# Patient Record
Sex: FEMALE | Race: WHITE | Employment: OTHER | URBAN - METROPOLITAN AREA
[De-identification: names, ages, dates, MRNs, and addresses within clinical notes are randomized per-mention and may not be internally consistent; named-entity substitution may affect disease eponyms.]

---

## 2023-05-22 ENCOUNTER — HOSPITAL ENCOUNTER (INPATIENT)
Age: 88
LOS: 4 days | Discharge: NURSING, REHAB OR LONG-TERM CARE | End: 2023-05-26
Attending: STUDENT IN AN ORGANIZED HEALTH CARE EDUCATION/TRAINING PROGRAM | Admitting: INTERNAL MEDICINE
Payer: COMMERCIAL

## 2023-05-22 ENCOUNTER — APPOINTMENT (OUTPATIENT)
Dept: GENERAL RADIOLOGY | Age: 88
End: 2023-05-22
Payer: COMMERCIAL

## 2023-05-22 DIAGNOSIS — D62 ACUTE BLOOD LOSS ANEMIA: ICD-10-CM

## 2023-05-22 DIAGNOSIS — K92.2 UPPER GI BLEED: ICD-10-CM

## 2023-05-22 DIAGNOSIS — J18.9 PNEUMONIA OF RIGHT LOWER LOBE DUE TO INFECTIOUS ORGANISM: ICD-10-CM

## 2023-05-22 DIAGNOSIS — K92.0 COFFEE GROUND EMESIS: Primary | ICD-10-CM

## 2023-05-22 DIAGNOSIS — Z86.19 HISTORY OF ESBL E. COLI INFECTION: ICD-10-CM

## 2023-05-22 DIAGNOSIS — I50.1 PULMONARY EDEMA WITH CONGESTIVE HEART FAILURE (HCC): ICD-10-CM

## 2023-05-22 LAB
ABO + RH BLD: NORMAL
ALBUMIN SERPL-MCNC: 2.5 G/DL (ref 3.4–5)
ALBUMIN/GLOB SERPL: 0.8 {RATIO} (ref 1.1–2.2)
ALP SERPL-CCNC: 65 U/L (ref 40–129)
ALT SERPL-CCNC: 23 U/L (ref 10–40)
ANION GAP SERPL CALCULATED.3IONS-SCNC: 15 MMOL/L (ref 3–16)
AST SERPL-CCNC: 54 U/L (ref 15–37)
BACTERIA URNS QL MICRO: ABNORMAL /HPF
BASOPHILS # BLD: 0 K/UL (ref 0–0.2)
BASOPHILS NFR BLD: 0 %
BILIRUB SERPL-MCNC: 0.3 MG/DL (ref 0–1)
BILIRUB UR QL STRIP.AUTO: NEGATIVE
BLD GP AB SCN SERPL QL: NORMAL
BUN SERPL-MCNC: 45 MG/DL (ref 7–20)
CALCIUM SERPL-MCNC: 7.9 MG/DL (ref 8.3–10.6)
CHLORIDE SERPL-SCNC: 113 MMOL/L (ref 99–110)
CLARITY UR: CLEAR
CO2 SERPL-SCNC: 19 MMOL/L (ref 21–32)
COLOR UR: YELLOW
CREAT SERPL-MCNC: 0.9 MG/DL (ref 0.6–1.2)
DEPRECATED RDW RBC AUTO: 14.1 % (ref 12.4–15.4)
EOSINOPHIL # BLD: 0 K/UL (ref 0–0.6)
EOSINOPHIL NFR BLD: 0 %
EPI CELLS #/AREA URNS HPF: ABNORMAL /HPF (ref 0–5)
GFR SERPLBLD CREATININE-BSD FMLA CKD-EPI: 57 ML/MIN/{1.73_M2}
GLUCOSE SERPL-MCNC: 135 MG/DL (ref 70–99)
GLUCOSE UR STRIP.AUTO-MCNC: NEGATIVE MG/DL
HCT VFR BLD AUTO: 22.4 % (ref 36–48)
HGB BLD-MCNC: 7.3 G/DL (ref 12–16)
HGB UR QL STRIP.AUTO: NEGATIVE
INR PPP: 2.23 (ref 0.84–1.16)
KETONES UR STRIP.AUTO-MCNC: NEGATIVE MG/DL
LEUKOCYTE ESTERASE UR QL STRIP.AUTO: NEGATIVE
LYMPHOCYTES # BLD: 1.2 K/UL (ref 1–5.1)
LYMPHOCYTES NFR BLD: 5.3 %
MAGNESIUM SERPL-MCNC: 1.4 MG/DL (ref 1.8–2.4)
MCH RBC QN AUTO: 30.3 PG (ref 26–34)
MCHC RBC AUTO-ENTMCNC: 32.4 G/DL (ref 31–36)
MCV RBC AUTO: 93.5 FL (ref 80–100)
MONOCYTES # BLD: 0.7 K/UL (ref 0–1.3)
MONOCYTES NFR BLD: 3.2 %
NEUTROPHILS # BLD: 20.1 K/UL (ref 1.7–7.7)
NEUTROPHILS NFR BLD: 91.5 %
NITRITE UR QL STRIP.AUTO: NEGATIVE
NT-PROBNP SERPL-MCNC: 4891 PG/ML (ref 0–449)
PH UR STRIP.AUTO: 6 [PH] (ref 5–8)
PLATELET # BLD AUTO: 365 K/UL (ref 135–450)
PMV BLD AUTO: 8.9 FL (ref 5–10.5)
POTASSIUM SERPL-SCNC: 3 MMOL/L (ref 3.5–5.1)
PROT SERPL-MCNC: 5.6 G/DL (ref 6.4–8.2)
PROT UR STRIP.AUTO-MCNC: 30 MG/DL
PROTHROMBIN TIME: 24.6 SEC (ref 11.5–14.8)
RBC # BLD AUTO: 2.39 M/UL (ref 4–5.2)
RBC #/AREA URNS HPF: ABNORMAL /HPF (ref 0–4)
SARS-COV-2 RDRP RESP QL NAA+PROBE: NOT DETECTED
SODIUM SERPL-SCNC: 147 MMOL/L (ref 136–145)
SP GR UR STRIP.AUTO: 1.02 (ref 1–1.03)
TROPONIN, HIGH SENSITIVITY: 59 NG/L (ref 0–14)
TROPONIN, HIGH SENSITIVITY: 66 NG/L (ref 0–14)
UA COMPLETE W REFLEX CULTURE PNL UR: ABNORMAL
UA DIPSTICK W REFLEX MICRO PNL UR: YES
URN SPEC COLLECT METH UR: ABNORMAL
UROBILINOGEN UR STRIP-ACNC: 0.2 E.U./DL
WBC # BLD AUTO: 22 K/UL (ref 4–11)
WBC #/AREA URNS HPF: ABNORMAL /HPF (ref 0–5)

## 2023-05-22 PROCEDURE — C9113 INJ PANTOPRAZOLE SODIUM, VIA: HCPCS | Performed by: STUDENT IN AN ORGANIZED HEALTH CARE EDUCATION/TRAINING PROGRAM

## 2023-05-22 PROCEDURE — 86850 RBC ANTIBODY SCREEN: CPT

## 2023-05-22 PROCEDURE — 96367 TX/PROPH/DG ADDL SEQ IV INF: CPT

## 2023-05-22 PROCEDURE — C9113 INJ PANTOPRAZOLE SODIUM, VIA: HCPCS | Performed by: INTERNAL MEDICINE

## 2023-05-22 PROCEDURE — 83735 ASSAY OF MAGNESIUM: CPT

## 2023-05-22 PROCEDURE — 83880 ASSAY OF NATRIURETIC PEPTIDE: CPT

## 2023-05-22 PROCEDURE — 81001 URINALYSIS AUTO W/SCOPE: CPT

## 2023-05-22 PROCEDURE — 85025 COMPLETE CBC W/AUTO DIFF WBC: CPT

## 2023-05-22 PROCEDURE — 99285 EMERGENCY DEPT VISIT HI MDM: CPT

## 2023-05-22 PROCEDURE — 6360000002 HC RX W HCPCS: Performed by: INTERNAL MEDICINE

## 2023-05-22 PROCEDURE — 96375 TX/PRO/DX INJ NEW DRUG ADDON: CPT

## 2023-05-22 PROCEDURE — 86900 BLOOD TYPING SEROLOGIC ABO: CPT

## 2023-05-22 PROCEDURE — 85610 PROTHROMBIN TIME: CPT

## 2023-05-22 PROCEDURE — 80053 COMPREHEN METABOLIC PANEL: CPT

## 2023-05-22 PROCEDURE — 84484 ASSAY OF TROPONIN QUANT: CPT

## 2023-05-22 PROCEDURE — 2060000000 HC ICU INTERMEDIATE R&B

## 2023-05-22 PROCEDURE — 71045 X-RAY EXAM CHEST 1 VIEW: CPT

## 2023-05-22 PROCEDURE — 87040 BLOOD CULTURE FOR BACTERIA: CPT

## 2023-05-22 PROCEDURE — 2580000003 HC RX 258: Performed by: STUDENT IN AN ORGANIZED HEALTH CARE EDUCATION/TRAINING PROGRAM

## 2023-05-22 PROCEDURE — 6360000002 HC RX W HCPCS: Performed by: STUDENT IN AN ORGANIZED HEALTH CARE EDUCATION/TRAINING PROGRAM

## 2023-05-22 PROCEDURE — 86901 BLOOD TYPING SEROLOGIC RH(D): CPT

## 2023-05-22 PROCEDURE — 96365 THER/PROPH/DIAG IV INF INIT: CPT

## 2023-05-22 PROCEDURE — 96368 THER/DIAG CONCURRENT INF: CPT

## 2023-05-22 PROCEDURE — 87635 SARS-COV-2 COVID-19 AMP PRB: CPT

## 2023-05-22 PROCEDURE — 2580000003 HC RX 258: Performed by: INTERNAL MEDICINE

## 2023-05-22 RX ORDER — FUROSEMIDE 10 MG/ML
20 INJECTION INTRAMUSCULAR; INTRAVENOUS ONCE
Status: COMPLETED | OUTPATIENT
Start: 2023-05-22 | End: 2023-05-22

## 2023-05-22 RX ORDER — SODIUM CHLORIDE 9 MG/ML
INJECTION, SOLUTION INTRAVENOUS PRN
Status: DISCONTINUED | OUTPATIENT
Start: 2023-05-22 | End: 2023-05-26 | Stop reason: HOSPADM

## 2023-05-22 RX ORDER — POLYETHYLENE GLYCOL 3350 17 G/17G
17 POWDER, FOR SOLUTION ORAL DAILY PRN
Status: DISCONTINUED | OUTPATIENT
Start: 2023-05-22 | End: 2023-05-26 | Stop reason: HOSPADM

## 2023-05-22 RX ORDER — ACETAMINOPHEN 650 MG/1
650 SUPPOSITORY RECTAL EVERY 6 HOURS PRN
Status: DISCONTINUED | OUTPATIENT
Start: 2023-05-22 | End: 2023-05-26 | Stop reason: HOSPADM

## 2023-05-22 RX ORDER — ONDANSETRON 4 MG/1
4 TABLET, ORALLY DISINTEGRATING ORAL EVERY 8 HOURS PRN
Status: DISCONTINUED | OUTPATIENT
Start: 2023-05-22 | End: 2023-05-26 | Stop reason: HOSPADM

## 2023-05-22 RX ORDER — SODIUM CHLORIDE, SODIUM LACTATE, POTASSIUM CHLORIDE, AND CALCIUM CHLORIDE .6; .31; .03; .02 G/100ML; G/100ML; G/100ML; G/100ML
1000 INJECTION, SOLUTION INTRAVENOUS ONCE
Status: COMPLETED | OUTPATIENT
Start: 2023-05-22 | End: 2023-05-22

## 2023-05-22 RX ORDER — FENTANYL CITRATE 50 UG/ML
25 INJECTION, SOLUTION INTRAMUSCULAR; INTRAVENOUS ONCE
Status: COMPLETED | OUTPATIENT
Start: 2023-05-22 | End: 2023-05-22

## 2023-05-22 RX ORDER — MAGNESIUM SULFATE IN WATER 40 MG/ML
4000 INJECTION, SOLUTION INTRAVENOUS ONCE
Status: COMPLETED | OUTPATIENT
Start: 2023-05-22 | End: 2023-05-22

## 2023-05-22 RX ORDER — SODIUM CHLORIDE 0.9 % (FLUSH) 0.9 %
5-40 SYRINGE (ML) INJECTION PRN
Status: DISCONTINUED | OUTPATIENT
Start: 2023-05-22 | End: 2023-05-26 | Stop reason: HOSPADM

## 2023-05-22 RX ORDER — ONDANSETRON 2 MG/ML
4 INJECTION INTRAMUSCULAR; INTRAVENOUS EVERY 6 HOURS PRN
Status: DISCONTINUED | OUTPATIENT
Start: 2023-05-22 | End: 2023-05-26 | Stop reason: HOSPADM

## 2023-05-22 RX ORDER — ACETAMINOPHEN 325 MG/1
650 TABLET ORAL EVERY 6 HOURS PRN
Status: DISCONTINUED | OUTPATIENT
Start: 2023-05-22 | End: 2023-05-26 | Stop reason: HOSPADM

## 2023-05-22 RX ORDER — SODIUM CHLORIDE 0.9 % (FLUSH) 0.9 %
5-40 SYRINGE (ML) INJECTION EVERY 12 HOURS SCHEDULED
Status: DISCONTINUED | OUTPATIENT
Start: 2023-05-22 | End: 2023-05-26 | Stop reason: HOSPADM

## 2023-05-22 RX ORDER — POTASSIUM CHLORIDE 7.45 MG/ML
10 INJECTION INTRAVENOUS
Status: DISPENSED | OUTPATIENT
Start: 2023-05-22 | End: 2023-05-22

## 2023-05-22 RX ORDER — PANTOPRAZOLE SODIUM 40 MG/10ML
80 INJECTION, POWDER, LYOPHILIZED, FOR SOLUTION INTRAVENOUS ONCE
Status: COMPLETED | OUTPATIENT
Start: 2023-05-22 | End: 2023-05-22

## 2023-05-22 RX ORDER — PANTOPRAZOLE SODIUM 40 MG/10ML
40 INJECTION, POWDER, LYOPHILIZED, FOR SOLUTION INTRAVENOUS 2 TIMES DAILY
Status: DISCONTINUED | OUTPATIENT
Start: 2023-05-22 | End: 2023-05-23

## 2023-05-22 RX ADMIN — PANTOPRAZOLE SODIUM 80 MG: 40 INJECTION, POWDER, FOR SOLUTION INTRAVENOUS at 16:30

## 2023-05-22 RX ADMIN — FENTANYL CITRATE 25 MCG: 50 INJECTION, SOLUTION INTRAMUSCULAR; INTRAVENOUS at 16:27

## 2023-05-22 RX ADMIN — MAGNESIUM SULFATE HEPTAHYDRATE 4000 MG: 40 INJECTION, SOLUTION INTRAVENOUS at 17:35

## 2023-05-22 RX ADMIN — POTASSIUM CHLORIDE 10 MEQ: 10 INJECTION, SOLUTION INTRAVENOUS at 21:00

## 2023-05-22 RX ADMIN — POTASSIUM CHLORIDE 10 MEQ: 10 INJECTION, SOLUTION INTRAVENOUS at 17:32

## 2023-05-22 RX ADMIN — VANCOMYCIN HYDROCHLORIDE 1000 MG: 10 INJECTION, POWDER, LYOPHILIZED, FOR SOLUTION INTRAVENOUS at 17:34

## 2023-05-22 RX ADMIN — SODIUM CHLORIDE, PRESERVATIVE FREE 10 ML: 5 INJECTION INTRAVENOUS at 22:42

## 2023-05-22 RX ADMIN — FUROSEMIDE 20 MG: 10 INJECTION, SOLUTION INTRAMUSCULAR; INTRAVENOUS at 17:29

## 2023-05-22 RX ADMIN — POTASSIUM CHLORIDE 10 MEQ: 10 INJECTION, SOLUTION INTRAVENOUS at 18:42

## 2023-05-22 RX ADMIN — SODIUM CHLORIDE, POTASSIUM CHLORIDE, SODIUM LACTATE AND CALCIUM CHLORIDE 1000 ML: 600; 310; 30; 20 INJECTION, SOLUTION INTRAVENOUS at 18:45

## 2023-05-22 RX ADMIN — PANTOPRAZOLE SODIUM 40 MG: 40 INJECTION, POWDER, FOR SOLUTION INTRAVENOUS at 22:49

## 2023-05-22 RX ADMIN — CEFOXITIN 1000 MG: 1 INJECTION, POWDER, FOR SOLUTION INTRAVENOUS at 16:36

## 2023-05-22 ASSESSMENT — PAIN SCALES - PAIN ASSESSMENT IN ADVANCED DEMENTIA (PAINAD)
FACIALEXPRESSION: 0
BODYLANGUAGE: 0
NEGVOCALIZATION: 0
BREATHING: 0
NEGVOCALIZATION: 0
TOTALSCORE: 1
BODYLANGUAGE: 0
BREATHING: 0
TOTALSCORE: 0
BODYLANGUAGE: 0
BREATHING: 1
CONSOLABILITY: 0
FACIALEXPRESSION: 0
NEGVOCALIZATION: 0
FACIALEXPRESSION: 0
CONSOLABILITY: 0
TOTALSCORE: 0
CONSOLABILITY: 0

## 2023-05-22 ASSESSMENT — PAIN SCALES - GENERAL: PAINLEVEL_OUTOF10: 0

## 2023-05-22 NOTE — H&P
remote infarcts. No acute  loss of gray-white matter differentiation. No mass effect. No acute intracranial hemorrhage. VENTRICLES/SULCI: Moderate diffuse proportionate dilation of ventricles and sulci, consistent with atrophy. No hydrocephalus EXTRA-AXIAL SPACES:  Unremarkable PARANASAL SINUSES/MASTOIDS: Unremarkable BONES:  Unremarkable OTHER: Atherosclerotic calcifications of the intracranial arteries. Left frontal scalp hematoma. IMPRESSION: No acute intracranial abnormality. SIGNED BY: Juan Johnson MD on 5/18/2023  3:48 PM   [facility] [phone] [facility] Call Center: [phone]       CT CERVICAL SPINE WO CONTRAST    Result Date: 5/18/2023  Site: [facility] #: [id number]: [id number] BNEDAccount #: [de-identified] #: [id number]: [id number]: CT CERVICAL SPINE WO CONTRASTExam Date/Time: 05/18/2023 03:05 PMAdmitting Diagnosis: fall, head trauma, dementia and  unable to clearReason for Exam: fall, head trauma, dementia and unable to clear Dictated by: Jorge A Pascual ELYSE: 05/18/2023 04:43 PMT: This document is confidential medical information. Unauthorized disclosure or use of this information is prohibited by law. If you are not the intended recipient of this document, please advise us by calling immediately [phone]. Impression/Conclusion below HISTORY:  fall, head trauma, dementia and unable to clear  fall, head trauma, dementia and unable to clear COMPARISON: None TECHNIQUE:  Axial CT images with coronal and sagittal reconstructions of the cervical spine NOTE:  If there are questions about the content of this report, please contact [address] [place] [address] radiology by calling [phone] FINDINGS: ALIGNMENT: No abnormal curvature BONES: Unremarkable.   No aggressive osseous lesion or fracture SOFT TISSUES:  Unremarkable DISC LEVELS: There are are moderate degenerative disc and endplate changes and severe multilevel degenerative facet

## 2023-05-22 NOTE — ED NOTES
Pt placed on bedside cardiac monitor.       [facility]  05/22/23 0236
Pt placed on pure wick. Granddaughter at bedside.      [facility]  05/22/23 8660
Incomprehensible speech  Best Motor Response: Withdraws from pain  [place] Coma Scale Score: 9  Active LDA's:   Peripheral IV 05/22/23 Right Forearm (Active)     PO Status: no diet order at time of this note   Pertinent or High Risk Medications/Drips: no   If Yes, please provide details: n/A  Pending Blood Product Administration: no       You may also review the ED PT Care Timeline found under the Summary Nursing Index tab. Recommendation    Pending orders SIGNED AND HELD TO BE RELEASED   Plan for Discharge (if known): Additional Comments: family at bedside. Hospice consult placed. Pt not able to answer any questions at this time.  Pt becoming more alert and now opening eyes to voice    If any further questions, please call Sending RN at [phone]    Electronically signed by: Electronically signed by Sachi Ramirez RN on 5/22/2023 at 4:50 PM      Sachi Ramirez [facility] [address] [facility]  05/22/23 1831

## 2023-05-22 NOTE — ED PROVIDER NOTES
cefOXitin (MEFOXIN) 1,000 mg in sodium chloride 0.9 % 50 mL IVPB (mini-bag)     Order Specific Question:   Antimicrobial Indications     Answer:   Urinary Tract Infection    fentaNYL (SUBLIMAZE) injection 25 mcg    pantoprazole (PROTONIX) injection 80 mg       CONSULTS:  None
cefOXitin (MEFOXIN) 1,000 mg in sodium chloride 0.9 % 50 mL IVPB (mini-bag)     Order Specific Question:   Antimicrobial Indications     Answer:   Urinary Tract Infection    fentaNYL (SUBLIMAZE) injection 25 mcg    pantoprazole (PROTONIX) injection 80 mg    vancomycin (VANCOCIN) 1,000 mg in sodium chloride 0.9 % 250 mL IVPB     Order Specific Question:   Antimicrobial Indications     Answer:   Aspiration Pneumonia     Order Specific Question:   Antimicrobial Indications     Answer:   Urinary Tract Infection    potassium chloride 10 mEq/100 mL IVPB (Peripheral Line)    magnesium sulfate 4000 mg in 100 mL IVPB premix    furosemide (LASIX) injection 20 mg       CONSULTS:  IP CONSULT TO PALLIATIVE CARE    Review of Systems     14 Point ROS performed and is negative except as noted in HPI. Past Medical, Surgical, Family, and Social History     She has no past medical history on file. She has no past surgical history on file. Her family history is not on file. She     Medications     Previous Medications    No medications on file       Allergies     She is allergic to benadryl [diphenhydramine].                    Eden Garcia MD  05/22/23 0538

## 2023-05-23 LAB
ALBUMIN SERPL-MCNC: 2.3 G/DL (ref 3.4–5)
ALP SERPL-CCNC: 71 U/L (ref 40–129)
ALT SERPL-CCNC: 23 U/L (ref 10–40)
ANION GAP SERPL CALCULATED.3IONS-SCNC: 12 MMOL/L (ref 3–16)
AST SERPL-CCNC: 43 U/L (ref 15–37)
BILIRUB DIRECT SERPL-MCNC: <0.2 MG/DL (ref 0–0.3)
BILIRUB INDIRECT SERPL-MCNC: ABNORMAL MG/DL (ref 0–1)
BILIRUB SERPL-MCNC: <0.2 MG/DL (ref 0–1)
BUN SERPL-MCNC: 42 MG/DL (ref 7–20)
CALCIUM SERPL-MCNC: 8.2 MG/DL (ref 8.3–10.6)
CHLORIDE SERPL-SCNC: 113 MMOL/L (ref 99–110)
CO2 SERPL-SCNC: 21 MMOL/L (ref 21–32)
CREAT SERPL-MCNC: 0.8 MG/DL (ref 0.6–1.2)
GFR SERPLBLD CREATININE-BSD FMLA CKD-EPI: >60 ML/MIN/{1.73_M2}
GLUCOSE SERPL-MCNC: 115 MG/DL (ref 70–99)
MAGNESIUM SERPL-MCNC: 2.4 MG/DL (ref 1.8–2.4)
POTASSIUM SERPL-SCNC: 3.3 MMOL/L (ref 3.5–5.1)
PROT SERPL-MCNC: 5.5 G/DL (ref 6.4–8.2)
SODIUM SERPL-SCNC: 146 MMOL/L (ref 136–145)

## 2023-05-23 PROCEDURE — 80076 HEPATIC FUNCTION PANEL: CPT

## 2023-05-23 PROCEDURE — 6360000002 HC RX W HCPCS: Performed by: INTERNAL MEDICINE

## 2023-05-23 PROCEDURE — 83735 ASSAY OF MAGNESIUM: CPT

## 2023-05-23 PROCEDURE — 36415 COLL VENOUS BLD VENIPUNCTURE: CPT

## 2023-05-23 PROCEDURE — 80048 BASIC METABOLIC PNL TOTAL CA: CPT

## 2023-05-23 PROCEDURE — 2580000003 HC RX 258: Performed by: INTERNAL MEDICINE

## 2023-05-23 PROCEDURE — C9113 INJ PANTOPRAZOLE SODIUM, VIA: HCPCS | Performed by: INTERNAL MEDICINE

## 2023-05-23 PROCEDURE — 99221 1ST HOSP IP/OBS SF/LOW 40: CPT | Performed by: NURSE PRACTITIONER

## 2023-05-23 PROCEDURE — 2060000000 HC ICU INTERMEDIATE R&B

## 2023-05-23 RX ORDER — MELOXICAM 7.5 MG/1
7.5 TABLET ORAL DAILY
COMMUNITY
Start: 2023-04-02

## 2023-05-23 RX ORDER — LOPERAMIDE HYDROCHLORIDE 2 MG/1
2 CAPSULE ORAL PRN
COMMUNITY

## 2023-05-23 RX ORDER — ONDANSETRON 4 MG/1
4 TABLET, ORALLY DISINTEGRATING ORAL EVERY 8 HOURS PRN
COMMUNITY
Start: 2023-05-12

## 2023-05-23 RX ORDER — LATANOPROST 50 UG/ML
1 SOLUTION/ DROPS OPHTHALMIC NIGHTLY
COMMUNITY

## 2023-05-23 RX ORDER — GLUC/MSM/COLGN2/HYAL/ANTIARTH3 375-375-20
1 TABLET ORAL DAILY
COMMUNITY

## 2023-05-23 RX ORDER — AMLODIPINE BESYLATE 5 MG/1
5 TABLET ORAL DAILY
COMMUNITY

## 2023-05-23 RX ORDER — HYDRALAZINE HYDROCHLORIDE 25 MG/1
25 TABLET, FILM COATED ORAL EVERY 8 HOURS
COMMUNITY

## 2023-05-23 RX ORDER — AMOXICILLIN 250 MG
1 CAPSULE ORAL DAILY PRN
COMMUNITY

## 2023-05-23 RX ORDER — GUAIFENESIN 200 MG/10ML
5 LIQUID ORAL EVERY 4 HOURS PRN
COMMUNITY

## 2023-05-23 RX ORDER — HYDROCODONE BITARTRATE AND ACETAMINOPHEN 5; 325 MG/1; MG/1
1 TABLET ORAL 2 TIMES DAILY
Status: ON HOLD | COMMUNITY
End: 2023-05-26 | Stop reason: HOSPADM

## 2023-05-23 RX ORDER — HYDRALAZINE HYDROCHLORIDE 20 MG/ML
10 INJECTION INTRAMUSCULAR; INTRAVENOUS EVERY 6 HOURS PRN
Status: DISPENSED | OUTPATIENT
Start: 2023-05-23 | End: 2023-05-26

## 2023-05-23 RX ORDER — ACETAMINOPHEN 500 MG
1000 TABLET ORAL EVERY 6 HOURS PRN
COMMUNITY

## 2023-05-23 RX ORDER — BUSPIRONE HYDROCHLORIDE 5 MG/1
5 TABLET ORAL 2 TIMES DAILY
COMMUNITY

## 2023-05-23 RX ORDER — FERROUS SULFATE 325(65) MG
325 TABLET ORAL
COMMUNITY

## 2023-05-23 RX ORDER — SERTRALINE HYDROCHLORIDE 25 MG/1
75 TABLET, FILM COATED ORAL DAILY
COMMUNITY

## 2023-05-23 RX ADMIN — CEFEPIME HYDROCHLORIDE 1000 MG: 1 INJECTION, POWDER, FOR SOLUTION INTRAMUSCULAR; INTRAVENOUS at 08:23

## 2023-05-23 RX ADMIN — PANTOPRAZOLE SODIUM 8 MG/HR: 40 INJECTION, POWDER, FOR SOLUTION INTRAVENOUS at 20:32

## 2023-05-23 RX ADMIN — SODIUM CHLORIDE, PRESERVATIVE FREE 10 ML: 5 INJECTION INTRAVENOUS at 08:23

## 2023-05-23 RX ADMIN — PANTOPRAZOLE SODIUM 40 MG: 40 INJECTION, POWDER, FOR SOLUTION INTRAVENOUS at 08:23

## 2023-05-23 RX ADMIN — SODIUM CHLORIDE, PRESERVATIVE FREE 10 ML: 5 INJECTION INTRAVENOUS at 20:32

## 2023-05-23 RX ADMIN — CEFEPIME HYDROCHLORIDE 1000 MG: 1 INJECTION, POWDER, FOR SOLUTION INTRAMUSCULAR; INTRAVENOUS at 21:29

## 2023-05-23 RX ADMIN — CEFEPIME HYDROCHLORIDE 1000 MG: 1 INJECTION, POWDER, FOR SOLUTION INTRAMUSCULAR; INTRAVENOUS at 00:08

## 2023-05-23 RX ADMIN — HYDRALAZINE HYDROCHLORIDE 10 MG: 20 INJECTION INTRAMUSCULAR; INTRAVENOUS at 13:00

## 2023-05-23 ASSESSMENT — PAIN SCALES - GENERAL
PAINLEVEL_OUTOF10: 0

## 2023-05-23 ASSESSMENT — PAIN SCALES - PAIN ASSESSMENT IN ADVANCED DEMENTIA (PAINAD)
CONSOLABILITY: 0
NEGVOCALIZATION: 0
NEGVOCALIZATION: 0
TOTALSCORE: 0
FACIALEXPRESSION: 0
FACIALEXPRESSION: 0
BODYLANGUAGE: 0
BODYLANGUAGE: 0
FACIALEXPRESSION: 0
CONSOLABILITY: 0
BODYLANGUAGE: 0
CONSOLABILITY: 0
CONSOLABILITY: 0
TOTALSCORE: 0
BREATHING: 0
BREATHING: 0
NEGVOCALIZATION: 0
FACIALEXPRESSION: 0
BODYLANGUAGE: 0
BREATHING: 0
TOTALSCORE: 0
BREATHING: 0
NEGVOCALIZATION: 0
TOTALSCORE: 0

## 2023-05-23 NOTE — PLAN OF CARE
Problem: Discharge Planning  Goal: Discharge to home or other facility with appropriate resources  Outcome: Progressing  Flowsheets (Taken 5/23/2023 0258)  Discharge to home or other facility with appropriate resources:   Identify barriers to discharge with patient and caregiver   Arrange for needed discharge resources and transportation as appropriate   Identify discharge learning needs (meds, wound care, etc)   Refer to discharge planning if patient needs post-hospital services based on physician order or complex needs related to functional status, cognitive ability or social support system     Problem: Safety - Adult  Goal: Free from fall injury  Outcome: Progressing  Flowsheets (Taken 5/23/2023 0258)  Free From Fall Injury:   Instruct family/caregiver on patient safety   Based on caregiver fall risk screen, instruct family/caregiver to ask for assistance with transferring infant if caregiver noted to have fall risk factors     Problem: Confusion  Goal: Confusion, delirium, dementia, or psychosis is improved or at baseline  Description: INTERVENTIONS:  1. Assess for possible contributors to thought disturbance, including medications, impaired vision or hearing, underlying metabolic abnormalities, dehydration, psychiatric diagnoses, and notify attending LIP  2. [place] high risk fall precautions, as indicated  3. Provide frequent short contacts to provide reality reorientation, refocusing and direction  4. Decrease environmental stimuli, including noise as appropriate  5. Monitor and intervene to maintain adequate nutrition, hydration, elimination, sleep and activity  6. If unable to ensure safety without constant attention obtain sitter and review sitter guidelines with assigned personnel  7.  Initiate Psychosocial CNS and Spiritual Care consult, as indicated  Outcome: Progressing  Flowsheets (Taken 5/23/2023 0258)  Effect of thought disturbance (confusion, delirium, dementia, or psychosis) are managed with

## 2023-05-23 NOTE — CARE COORDINATION
Case Management Assessment  Initial Evaluation    Date/Time of Evaluation: 5/23/2023 12:08 PM  Assessment Completed by: Ranjith Luis RN    If patient is discharged prior to next notation, then this note serves as note for discharge by case management. Patient Name: Loreta Barriga                   YOB: 1922  Diagnosis: Acute blood loss anemia [D62]  GI bleed [K92.2]  Upper GI bleed [K92.2]  Coffee ground emesis [K92.0]  Pulmonary edema with congestive heart failure (HCC) [I50.1]  History of ESBL E. coli infection [Z86.19]  Pneumonia of right lower lobe due to infectious organism [J18.9]                   Date / Time: 5/22/2023  1:37 PM    Patient Admission Status: Inpatient   Readmission Risk (Low < 19, Mod (19-27), High > 27): Readmission Risk Score: 14.2    Current PCP: No primary care provider on file. PCP verified by CM? No    Chart Reviewed: Yes      History Provided by: Medical Record, Child/Family  Patient Orientation: Unresponsive    Patient Cognition: Dementia / Early Alzheimer's    Hospitalization in the last 30 days (Readmission):  No    If yes, Readmission Assessment in  Navigator will be completed. Advance Directives:      Code Status: DNR-CCA   Patient's Primary Decision Maker is: Legal Next of Kin    Primary Decision Maker: [facility] [phone]    Discharge Planning:    Patient lives with: Other (Comment) (LTC) Type of Home: Long-Term Care  Primary Care Giver:  Other (Comment) (LtC staff)  Patient Support Systems include: Children   Current Financial resources:    Current community resources:    Current services prior to admission: Extended Care Facility            Current DME:              Type of Home Care services:  None    ADLS  Prior functional level: Assistance with the following:, Mobility, Shopping, Housework, Bathing, Dressing, Toileting, Feeding, Cooking  Current functional level: Cooking, Feeding, Toileting, Dressing, Bathing, Assistance with the

## 2023-05-24 LAB — HGB BLD-MCNC: 7.9 G/DL (ref 12–16)

## 2023-05-24 PROCEDURE — 85018 HEMOGLOBIN: CPT

## 2023-05-24 PROCEDURE — 2580000003 HC RX 258: Performed by: INTERNAL MEDICINE

## 2023-05-24 PROCEDURE — 36415 COLL VENOUS BLD VENIPUNCTURE: CPT

## 2023-05-24 PROCEDURE — C9113 INJ PANTOPRAZOLE SODIUM, VIA: HCPCS | Performed by: INTERNAL MEDICINE

## 2023-05-24 PROCEDURE — 92526 ORAL FUNCTION THERAPY: CPT

## 2023-05-24 PROCEDURE — 6360000002 HC RX W HCPCS: Performed by: INTERNAL MEDICINE

## 2023-05-24 PROCEDURE — 2060000000 HC ICU INTERMEDIATE R&B

## 2023-05-24 PROCEDURE — 99233 SBSQ HOSP IP/OBS HIGH 50: CPT | Performed by: NURSE PRACTITIONER

## 2023-05-24 PROCEDURE — 92610 EVALUATE SWALLOWING FUNCTION: CPT

## 2023-05-24 RX ADMIN — SODIUM CHLORIDE 25 ML: 9 INJECTION, SOLUTION INTRAVENOUS at 08:17

## 2023-05-24 RX ADMIN — PANTOPRAZOLE SODIUM 8 MG/HR: 40 INJECTION, POWDER, FOR SOLUTION INTRAVENOUS at 04:30

## 2023-05-24 RX ADMIN — PANTOPRAZOLE SODIUM 8 MG/HR: 40 INJECTION, POWDER, FOR SOLUTION INTRAVENOUS at 16:40

## 2023-05-24 RX ADMIN — CEFEPIME HYDROCHLORIDE 1000 MG: 1 INJECTION, POWDER, FOR SOLUTION INTRAMUSCULAR; INTRAVENOUS at 20:20

## 2023-05-24 RX ADMIN — SODIUM CHLORIDE, PRESERVATIVE FREE 10 ML: 5 INJECTION INTRAVENOUS at 08:07

## 2023-05-24 RX ADMIN — CEFEPIME HYDROCHLORIDE 1000 MG: 1 INJECTION, POWDER, FOR SOLUTION INTRAMUSCULAR; INTRAVENOUS at 08:17

## 2023-05-24 ASSESSMENT — PAIN SCALES - PAIN ASSESSMENT IN ADVANCED DEMENTIA (PAINAD)
CONSOLABILITY: 0
NEGVOCALIZATION: 0
FACIALEXPRESSION: 0
FACIALEXPRESSION: 0
BREATHING: 0
BODYLANGUAGE: 0
BREATHING: 0
TOTALSCORE: 0
BREATHING: 0
CONSOLABILITY: 0
NEGVOCALIZATION: 0
BODYLANGUAGE: 0
FACIALEXPRESSION: 0
NEGVOCALIZATION: 0
NEGVOCALIZATION: 0
BODYLANGUAGE: 0
TOTALSCORE: 0
CONSOLABILITY: 0
BREATHING: 0
BODYLANGUAGE: 0
TOTALSCORE: 0
BODYLANGUAGE: 0
NEGVOCALIZATION: 0
BREATHING: 0
FACIALEXPRESSION: 0
BREATHING: 0
FACIALEXPRESSION: 0
CONSOLABILITY: 0
NEGVOCALIZATION: 0
CONSOLABILITY: 0
BREATHING: 0
BREATHING: 0
NEGVOCALIZATION: 0
BREATHING: 0
TOTALSCORE: 0
BODYLANGUAGE: 0
BODYLANGUAGE: 0
CONSOLABILITY: 0
FACIALEXPRESSION: 0
TOTALSCORE: 0
CONSOLABILITY: 0
CONSOLABILITY: 0
NEGVOCALIZATION: 0
TOTALSCORE: 0
CONSOLABILITY: 0
BREATHING: 0
NEGVOCALIZATION: 0
TOTALSCORE: 0
FACIALEXPRESSION: 0
BODYLANGUAGE: 0
FACIALEXPRESSION: 0
BODYLANGUAGE: 0
TOTALSCORE: 0
TOTALSCORE: 0
NEGVOCALIZATION: 0
BODYLANGUAGE: 0
TOTALSCORE: 0
CONSOLABILITY: 0

## 2023-05-24 ASSESSMENT — PAIN SCALES - GENERAL
PAINLEVEL_OUTOF10: 0

## 2023-05-24 NOTE — CARE COORDINATION
CM following for discharge planning. Pt is from [address] [facility], family was interested in moving the patient to [facility] as daughter Zane Dillard lives closer to [facility]. CM reached out to [facility] in admissions and they do not currently have a long term care bed available but could put the patient on a waiting list. CM spoke with Zane Dillard over the phone and she was in agreement for the patient to return to [email or web address] at this time. Pt will need transport. Family [facility] also [facility] [phone] and [place] [facility]  [phone] at discharge so they can meet with them at the facility and choose one. CM following.     Holland Covarrubias RN, BSN, [address]  Case Management Department  [phone]

## 2023-05-24 NOTE — PLAN OF CARE
Problem: Confusion  Goal: Confusion, delirium, dementia, or psychosis is improved or at baseline  Description: INTERVENTIONS:  1. Assess for possible contributors to thought disturbance, including medications, impaired vision or hearing, underlying metabolic abnormalities, dehydration, psychiatric diagnoses, and notify attending LIP  2. [place] high risk fall precautions, as indicated  3. Provide frequent short contacts to provide reality reorientation, refocusing and direction  4. Decrease environmental stimuli, including noise as appropriate  5. Monitor and intervene to maintain adequate nutrition, hydration, elimination, sleep and activity  6. If unable to ensure safety without constant attention obtain sitter and review sitter guidelines with assigned personnel  7. Initiate Psychosocial CNS and Spiritual Care consult, as indicated  Outcome: Not Progressing  Flowsheets  Taken 5/24/2023 1631 by Shanna Dior RN  Effect of thought disturbance (confusion, delirium, dementia, or psychosis) are managed with adequate functional status:   Assess for contributors to thought disturbance, including medications, impaired vision or hearing, underlying metabolic abnormalities, dehydration, psychiatric diagnoses, notify [facility] high risk fall precautions, as indicated   Provide frequent short contacts to provide reality reorientation, refocusing and direction  Taken 5/24/2023 0301 by Lakesha Alexander RN  Effect of thought disturbance (confusion, delirium, dementia, or psychosis) are managed with adequate functional status: Assess for contributors to thought disturbance, including medications, impaired vision or hearing, underlying metabolic abnormalities, dehydration, psychiatric diagnoses, notify LIP     Problem: Skin/Tissue Integrity  Goal: Absence of new skin breakdown  Description: 1. Monitor for areas of redness and/or skin breakdown  2. Assess vascular access sites hourly  3.   Every 4-6 hours minimum:

## 2023-05-24 NOTE — PLAN OF CARE
Patient will tolerate least restrictive diet without s/s of aspiration.     Hien Has MA CCC/SLP 3699

## 2023-05-24 NOTE — PLAN OF CARE
Problem: Discharge Planning  Goal: Discharge to home or other facility with appropriate resources  Outcome: Progressing  Flowsheets  Taken 5/23/2023 2224  Discharge to home or other facility with appropriate resources:   Identify barriers to discharge with patient and caregiver   Arrange for needed discharge resources and transportation as appropriate   Identify discharge learning needs (meds, wound care, etc)  Taken 5/23/2023 2016  Discharge to home or other facility with appropriate resources: Identify barriers to discharge with patient and caregiver     Problem: Safety - Adult  Goal: Free from fall injury  Outcome: Progressing  Flowsheets (Taken 5/23/2023 2224)  Free From Fall Injury: Instruct family/caregiver on patient safety     Problem: Confusion  Goal: Confusion, delirium, dementia, or psychosis is improved or at baseline  Description: INTERVENTIONS:  1. Assess for possible contributors to thought disturbance, including medications, impaired vision or hearing, underlying metabolic abnormalities, dehydration, psychiatric diagnoses, and notify attending LIP  2. [place] high risk fall precautions, as indicated  3. Provide frequent short contacts to provide reality reorientation, refocusing and direction  4. Decrease environmental stimuli, including noise as appropriate  5. Monitor and intervene to maintain adequate nutrition, hydration, elimination, sleep and activity  6. If unable to ensure safety without constant attention obtain sitter and review sitter guidelines with assigned personnel  7.  Initiate Psychosocial CNS and Spiritual Care consult, as indicated  Outcome: Progressing  Flowsheets  Taken 5/23/2023 2224  Effect of thought disturbance (confusion, delirium, dementia, or psychosis) are managed with adequate functional status:   [place] high risk fall precautions, as indicated   Provide frequent short contacts to provide reality reorientation, refocusing and direction   Assess for contributors to

## 2023-05-25 PROBLEM — D62 ACUTE BLOOD LOSS ANEMIA: Status: ACTIVE | Noted: 2023-05-25

## 2023-05-25 PROBLEM — F03.90 DEMENTIA (HCC): Status: ACTIVE | Noted: 2023-05-25

## 2023-05-25 PROBLEM — R13.10 DYSPHAGIA: Status: ACTIVE | Noted: 2023-05-25

## 2023-05-25 PROBLEM — J69.0 ASPIRATION PNEUMONITIS (HCC): Status: ACTIVE | Noted: 2023-05-25

## 2023-05-25 LAB
ANION GAP SERPL CALCULATED.3IONS-SCNC: 12 MMOL/L (ref 3–16)
BASOPHILS # BLD: 0.1 K/UL (ref 0–0.2)
BASOPHILS NFR BLD: 0.6 %
BUN SERPL-MCNC: 29 MG/DL (ref 7–20)
CALCIUM SERPL-MCNC: 8.1 MG/DL (ref 8.3–10.6)
CHLORIDE SERPL-SCNC: 110 MMOL/L (ref 99–110)
CO2 SERPL-SCNC: 20 MMOL/L (ref 21–32)
CREAT SERPL-MCNC: 0.6 MG/DL (ref 0.6–1.2)
DEPRECATED RDW RBC AUTO: 14.2 % (ref 12.4–15.4)
EOSINOPHIL # BLD: 0.1 K/UL (ref 0–0.6)
EOSINOPHIL NFR BLD: 0.4 %
GFR SERPLBLD CREATININE-BSD FMLA CKD-EPI: >60 ML/MIN/{1.73_M2}
GLUCOSE SERPL-MCNC: 97 MG/DL (ref 70–99)
HCT VFR BLD AUTO: 23.6 % (ref 36–48)
HGB BLD-MCNC: 8 G/DL (ref 12–16)
LYMPHOCYTES # BLD: 1.1 K/UL (ref 1–5.1)
LYMPHOCYTES NFR BLD: 7.2 %
MAGNESIUM SERPL-MCNC: 1.5 MG/DL (ref 1.8–2.4)
MCH RBC QN AUTO: 31.3 PG (ref 26–34)
MCHC RBC AUTO-ENTMCNC: 33.9 G/DL (ref 31–36)
MCV RBC AUTO: 92.4 FL (ref 80–100)
MONOCYTES # BLD: 0.7 K/UL (ref 0–1.3)
MONOCYTES NFR BLD: 4.7 %
NEUTROPHILS # BLD: 13.7 K/UL (ref 1.7–7.7)
NEUTROPHILS NFR BLD: 87.1 %
PLATELET # BLD AUTO: 253 K/UL (ref 135–450)
PMV BLD AUTO: 9.4 FL (ref 5–10.5)
POTASSIUM SERPL-SCNC: 3.2 MMOL/L (ref 3.5–5.1)
RBC # BLD AUTO: 2.55 M/UL (ref 4–5.2)
SODIUM SERPL-SCNC: 142 MMOL/L (ref 136–145)
WBC # BLD AUTO: 15.7 K/UL (ref 4–11)

## 2023-05-25 PROCEDURE — 6360000002 HC RX W HCPCS: Performed by: INTERNAL MEDICINE

## 2023-05-25 PROCEDURE — 6370000000 HC RX 637 (ALT 250 FOR IP): Performed by: INTERNAL MEDICINE

## 2023-05-25 PROCEDURE — 80048 BASIC METABOLIC PNL TOTAL CA: CPT

## 2023-05-25 PROCEDURE — C9113 INJ PANTOPRAZOLE SODIUM, VIA: HCPCS | Performed by: INTERNAL MEDICINE

## 2023-05-25 PROCEDURE — 2580000003 HC RX 258: Performed by: INTERNAL MEDICINE

## 2023-05-25 PROCEDURE — 85025 COMPLETE CBC W/AUTO DIFF WBC: CPT

## 2023-05-25 PROCEDURE — 92526 ORAL FUNCTION THERAPY: CPT

## 2023-05-25 PROCEDURE — 2060000000 HC ICU INTERMEDIATE R&B

## 2023-05-25 PROCEDURE — 83735 ASSAY OF MAGNESIUM: CPT

## 2023-05-25 PROCEDURE — 36415 COLL VENOUS BLD VENIPUNCTURE: CPT

## 2023-05-25 RX ORDER — MAGNESIUM SULFATE 1 G/100ML
1000 INJECTION INTRAVENOUS ONCE
Status: COMPLETED | OUTPATIENT
Start: 2023-05-25 | End: 2023-05-25

## 2023-05-25 RX ORDER — PANTOPRAZOLE SODIUM 40 MG/1
40 TABLET, DELAYED RELEASE ORAL
Status: DISCONTINUED | OUTPATIENT
Start: 2023-05-25 | End: 2023-05-26 | Stop reason: HOSPADM

## 2023-05-25 RX ADMIN — MAGNESIUM SULFATE HEPTAHYDRATE 1000 MG: 1 INJECTION, SOLUTION INTRAVENOUS at 15:30

## 2023-05-25 RX ADMIN — PANTOPRAZOLE SODIUM 40 MG: 40 TABLET, DELAYED RELEASE ORAL at 15:28

## 2023-05-25 RX ADMIN — SODIUM CHLORIDE 25 ML: 9 INJECTION, SOLUTION INTRAVENOUS at 08:17

## 2023-05-25 RX ADMIN — CEFEPIME HYDROCHLORIDE 1000 MG: 1 INJECTION, POWDER, FOR SOLUTION INTRAMUSCULAR; INTRAVENOUS at 08:18

## 2023-05-25 RX ADMIN — CEFEPIME HYDROCHLORIDE 1000 MG: 1 INJECTION, POWDER, FOR SOLUTION INTRAMUSCULAR; INTRAVENOUS at 20:25

## 2023-05-25 RX ADMIN — PANTOPRAZOLE SODIUM 8 MG/HR: 40 INJECTION, POWDER, FOR SOLUTION INTRAVENOUS at 13:14

## 2023-05-25 RX ADMIN — SODIUM CHLORIDE, PRESERVATIVE FREE 10 ML: 5 INJECTION INTRAVENOUS at 08:09

## 2023-05-25 RX ADMIN — SODIUM CHLORIDE, PRESERVATIVE FREE 10 ML: 5 INJECTION INTRAVENOUS at 20:34

## 2023-05-25 RX ADMIN — PANTOPRAZOLE SODIUM 8 MG/HR: 40 INJECTION, POWDER, FOR SOLUTION INTRAVENOUS at 01:52

## 2023-05-25 RX ADMIN — SODIUM CHLORIDE 25 ML: 9 INJECTION, SOLUTION INTRAVENOUS at 15:29

## 2023-05-25 ASSESSMENT — PAIN SCALES - PAIN ASSESSMENT IN ADVANCED DEMENTIA (PAINAD)
BODYLANGUAGE: 0
TOTALSCORE: 0
FACIALEXPRESSION: 0
BREATHING: 0
BODYLANGUAGE: 0
FACIALEXPRESSION: 0
BODYLANGUAGE: 0
BODYLANGUAGE: 0
CONSOLABILITY: 0
NEGVOCALIZATION: 0
TOTALSCORE: 0
FACIALEXPRESSION: 0
NEGVOCALIZATION: 0
BREATHING: 0
TOTALSCORE: 0
FACIALEXPRESSION: 0
BODYLANGUAGE: 0
CONSOLABILITY: 0
FACIALEXPRESSION: 0
BODYLANGUAGE: 0
FACIALEXPRESSION: 0
BREATHING: 0
CONSOLABILITY: 0
FACIALEXPRESSION: 0
BREATHING: 0
FACIALEXPRESSION: 0
BREATHING: 0
TOTALSCORE: 0
BODYLANGUAGE: 0
CONSOLABILITY: 0
CONSOLABILITY: 0
BREATHING: 0
FACIALEXPRESSION: 0
FACIALEXPRESSION: 0
NEGVOCALIZATION: 0
BODYLANGUAGE: 0
TOTALSCORE: 0
BREATHING: 0
BODYLANGUAGE: 0
NEGVOCALIZATION: 0
CONSOLABILITY: 0
BREATHING: 0
NEGVOCALIZATION: 0
NEGVOCALIZATION: 0
BREATHING: 0
NEGVOCALIZATION: 0
NEGVOCALIZATION: 0
TOTALSCORE: 0
BODYLANGUAGE: 0
CONSOLABILITY: 0
CONSOLABILITY: 0
NEGVOCALIZATION: 0
TOTALSCORE: 0
BREATHING: 0
CONSOLABILITY: 0
NEGVOCALIZATION: 0
TOTALSCORE: 0
CONSOLABILITY: 0

## 2023-05-25 ASSESSMENT — PAIN SCALES - WONG BAKER
WONGBAKER_NUMERICALRESPONSE: 0

## 2023-05-25 ASSESSMENT — PAIN SCALES - GENERAL
PAINLEVEL_OUTOF10: 0

## 2023-05-25 NOTE — PLAN OF CARE
Problem: Safety - Adult  Goal: Free from fall injury  Outcome: Progressing  Flowsheets (Taken 5/25/2023 1727)  Free From Fall Injury:   Instruct family/caregiver on patient safety   Based on caregiver fall risk screen, instruct family/caregiver to ask for assistance with transferring infant if caregiver noted to have fall risk factors     Problem: Confusion  Goal: Confusion, delirium, dementia, or psychosis is improved or at baseline  Description: INTERVENTIONS:  1. Assess for possible contributors to thought disturbance, including medications, impaired vision or hearing, underlying metabolic abnormalities, dehydration, psychiatric diagnoses, and notify attending LIP  2. [place] high risk fall precautions, as indicated  3. Provide frequent short contacts to provide reality reorientation, refocusing and direction  4. Decrease environmental stimuli, including noise as appropriate  5. Monitor and intervene to maintain adequate nutrition, hydration, elimination, sleep and activity  6. If unable to ensure safety without constant attention obtain sitter and review sitter guidelines with assigned personnel  7. Initiate Psychosocial CNS and Spiritual Care consult, as indicated  Flowsheets (Taken 5/25/2023 1727)  Effect of thought disturbance (confusion, delirium, dementia, or psychosis) are managed with adequate functional status:   Assess for contributors to thought disturbance, including medications, impaired vision or hearing, underlying metabolic abnormalities, dehydration, psychiatric diagnoses, notify [place] high risk fall precautions, as indicated   Provide frequent short contacts to provide reality reorientation, refocusing and direction     Problem: Skin/Tissue Integrity  Goal: Absence of new skin breakdown  Description: 1. Monitor for areas of redness and/or skin breakdown  2. Assess vascular access sites hourly  3. Every 4-6 hours minimum:  Change oxygen saturation probe site  4.   Every 4-6 hours:

## 2023-05-25 NOTE — CARE COORDINATION
[facility] at [facility] time because [facility] has no [facility] available at this time. Pt will need transport. Family would also like referrals made to both [facility] [phone] and [place]  [phone] at the time of discharge so they can meet with them at the facility and choose one. CM following.  Electronically signed by Berny Olivia RN on 5/25/2023 at 6:17 PM

## 2023-05-25 NOTE — PLAN OF CARE
Problem: Discharge Planning  Goal: Discharge to home or other facility with appropriate resources  Outcome: Progressing  Flowsheets  Taken 5/24/2023 2343  Discharge to home or other facility with appropriate resources:   Identify barriers to discharge with patient and caregiver   Arrange for needed discharge resources and transportation as appropriate   Identify discharge learning needs (meds, wound care, etc)  Taken 5/24/2023 1955  Discharge to home or other facility with appropriate resources: Identify barriers to discharge with patient and caregiver     Problem: Safety - Adult  Goal: Free from fall injury  Outcome: Progressing  Flowsheets (Taken 5/23/2023 2224)  Free From Fall Injury: Instruct family/caregiver on patient safety     Problem: Confusion  Goal: Confusion, delirium, dementia, or psychosis is improved or at baseline  Description: INTERVENTIONS:  1. Assess for possible contributors to thought disturbance, including medications, impaired vision or hearing, underlying metabolic abnormalities, dehydration, psychiatric diagnoses, and notify attending LIP  2. [place] high risk fall precautions, as indicated  3. Provide frequent short contacts to provide reality reorientation, refocusing and direction  4. Decrease environmental stimuli, including noise as appropriate  5. Monitor and intervene to maintain adequate nutrition, hydration, elimination, sleep and activity  6. If unable to ensure safety without constant attention obtain sitter and review sitter guidelines with assigned personnel  7.  Initiate Psychosocial CNS and Spiritual Care consult, as indicated  5/24/2023 2343 by Savanah Whittington RN  Outcome: Progressing  Flowsheets  Taken 5/24/2023 2343  Effect of thought disturbance (confusion, delirium, dementia, or psychosis) are managed with adequate functional status:   Assess for contributors to thought disturbance, including medications, impaired vision or hearing, underlying metabolic abnormalities,

## 2023-05-26 VITALS
DIASTOLIC BLOOD PRESSURE: 55 MMHG | BODY MASS INDEX: 19.81 KG/M2 | SYSTOLIC BLOOD PRESSURE: 150 MMHG | HEIGHT: 61 IN | OXYGEN SATURATION: 98 % | RESPIRATION RATE: 16 BRPM | TEMPERATURE: 97.7 F | WEIGHT: 104.94 LBS | HEART RATE: 61 BPM

## 2023-05-26 PROBLEM — D62 ACUTE BLOOD LOSS ANEMIA: Status: RESOLVED | Noted: 2023-05-25 | Resolved: 2023-05-26

## 2023-05-26 PROBLEM — K92.2 GI BLEED: Status: RESOLVED | Noted: 2023-05-22 | Resolved: 2023-05-26

## 2023-05-26 LAB
ANION GAP SERPL CALCULATED.3IONS-SCNC: 15 MMOL/L (ref 3–16)
BACTERIA BLD CULT ORG #2: NORMAL
BACTERIA BLD CULT: NORMAL
BUN SERPL-MCNC: 23 MG/DL (ref 7–20)
CALCIUM SERPL-MCNC: 8 MG/DL (ref 8.3–10.6)
CHLORIDE SERPL-SCNC: 110 MMOL/L (ref 99–110)
CO2 SERPL-SCNC: 16 MMOL/L (ref 21–32)
CREAT SERPL-MCNC: 0.7 MG/DL (ref 0.6–1.2)
GFR SERPLBLD CREATININE-BSD FMLA CKD-EPI: >60 ML/MIN/{1.73_M2}
GLUCOSE SERPL-MCNC: 99 MG/DL (ref 70–99)
MAGNESIUM SERPL-MCNC: 1.7 MG/DL (ref 1.8–2.4)
PHOSPHATE SERPL-MCNC: 2.9 MG/DL (ref 2.5–4.9)
POTASSIUM SERPL-SCNC: 3.8 MMOL/L (ref 3.5–5.1)
SODIUM SERPL-SCNC: 141 MMOL/L (ref 136–145)

## 2023-05-26 PROCEDURE — 6360000002 HC RX W HCPCS: Performed by: INTERNAL MEDICINE

## 2023-05-26 PROCEDURE — 83735 ASSAY OF MAGNESIUM: CPT

## 2023-05-26 PROCEDURE — 84100 ASSAY OF PHOSPHORUS: CPT

## 2023-05-26 PROCEDURE — 80048 BASIC METABOLIC PNL TOTAL CA: CPT

## 2023-05-26 PROCEDURE — 6370000000 HC RX 637 (ALT 250 FOR IP): Performed by: INTERNAL MEDICINE

## 2023-05-26 PROCEDURE — 2580000003 HC RX 258: Performed by: INTERNAL MEDICINE

## 2023-05-26 PROCEDURE — 36415 COLL VENOUS BLD VENIPUNCTURE: CPT

## 2023-05-26 PROCEDURE — 92526 ORAL FUNCTION THERAPY: CPT

## 2023-05-26 RX ORDER — MAGNESIUM SULFATE IN WATER 40 MG/ML
2000 INJECTION, SOLUTION INTRAVENOUS ONCE
Status: COMPLETED | OUTPATIENT
Start: 2023-05-26 | End: 2023-05-26

## 2023-05-26 RX ORDER — PANTOPRAZOLE SODIUM 40 MG/1
40 TABLET, DELAYED RELEASE ORAL
Qty: 30 TABLET | Refills: 3 | Status: SHIPPED | OUTPATIENT
Start: 2023-05-26

## 2023-05-26 RX ORDER — MAGNESIUM OXIDE 400 MG/1
400 TABLET ORAL DAILY
Qty: 10 TABLET | Refills: 0 | Status: SHIPPED | OUTPATIENT
Start: 2023-05-26

## 2023-05-26 RX ORDER — AMOXICILLIN AND CLAVULANATE POTASSIUM 875; 125 MG/1; MG/1
1 TABLET, FILM COATED ORAL 2 TIMES DAILY
Qty: 6 TABLET | Refills: 0 | Status: SHIPPED | OUTPATIENT
Start: 2023-05-26 | End: 2023-05-29

## 2023-05-26 RX ADMIN — MAGNESIUM SULFATE HEPTAHYDRATE 2000 MG: 40 INJECTION, SOLUTION INTRAVENOUS at 12:48

## 2023-05-26 RX ADMIN — SODIUM CHLORIDE 25 ML: 9 INJECTION, SOLUTION INTRAVENOUS at 08:32

## 2023-05-26 RX ADMIN — SODIUM CHLORIDE, PRESERVATIVE FREE 10 ML: 5 INJECTION INTRAVENOUS at 08:29

## 2023-05-26 RX ADMIN — CEFEPIME HYDROCHLORIDE 1000 MG: 1 INJECTION, POWDER, FOR SOLUTION INTRAMUSCULAR; INTRAVENOUS at 08:34

## 2023-05-26 RX ADMIN — SODIUM CHLORIDE 25 ML: 9 INJECTION, SOLUTION INTRAVENOUS at 12:47

## 2023-05-26 ASSESSMENT — PAIN SCALES - WONG BAKER
WONGBAKER_NUMERICALRESPONSE: 0

## 2023-05-26 ASSESSMENT — PAIN SCALES - GENERAL
PAINLEVEL_OUTOF10: 0

## 2023-05-26 NOTE — PLAN OF CARE
Problem: Discharge Planning  Goal: Discharge to home or other facility with appropriate resources  5/26/2023 1312 by Jared Agudelo RN  Outcome: Progressing  Flowsheets (Taken 5/26/2023 1312)  Discharge to home or other facility with appropriate resources:   Identify barriers to discharge with patient and caregiver   Arrange for needed discharge resources and transportation as appropriate   Identify discharge learning needs (meds, wound care, etc)     Problem: Safety - Adult  Goal: Free from fall injury  5/26/2023 1312 by Jared Agudelo RN  Outcome: Progressing  Flowsheets (Taken 5/26/2023 1312)  Free From Fall Injury:   Based on caregiver fall risk screen, instruct family/caregiver to ask for assistance with transferring infant if caregiver noted to have fall risk factors   Instruct family/caregiver on patient safety     Problem: Confusion  Goal: Confusion, delirium, dementia, or psychosis is improved or at baseline  Description: INTERVENTIONS:  1. Assess for possible contributors to thought disturbance, including medications, impaired vision or hearing, underlying metabolic abnormalities, dehydration, psychiatric diagnoses, and notify attending LIP  2. [place] high risk fall precautions, as indicated  3. Provide frequent short contacts to provide reality reorientation, refocusing and direction  4. Decrease environmental stimuli, including noise as appropriate  5. Monitor and intervene to maintain adequate nutrition, hydration, elimination, sleep and activity  6. If unable to ensure safety without constant attention obtain sitter and review sitter guidelines with assigned personnel  7.  Initiate Psychosocial CNS and Spiritual Care consult, as indicated  5/26/2023 1312 by Jared Agudelo RN  Outcome: Not Progressing  Flowsheets (Taken 5/26/2023 1312)  Effect of thought disturbance (confusion, delirium, dementia, or psychosis) are managed with adequate functional status:   Assess for contributors to thought

## 2023-05-26 NOTE — DISCHARGE INSTR - COC
Swallowing Test): not done    Treatments at the Time of Hospital Discharge:   Respiratory Treatments:   Oxygen Therapy:  is not on home oxygen therapy. Ventilator:    - No ventilator support    Rehab Therapies: Speech/Language Therapy  Weight Bearing Status/Restrictions: No weight bearing restrictions  Other Medical Equipment (for information only, NOT a DME order): Other Treatments:     Patient's personal belongings (please select all that are sent with patient):  migdalia Canales RN SIGNATURE:  Electronically signed by Addy Gil RN on 5/26/23 at 1:10 PM EDT    CASE MANAGEMENT/SOCIAL WORK SECTION    Inpatient Status Date: ***    Readmission Risk Assessment Score:  Readmission Risk              Risk of Unplanned Readmission:  10           Discharging to Facility/ Agency   Name:   Address:  Phone:  Fax:    Dialysis Facility (if applicable)   Name:  Address:  Dialysis Schedule:  Phone:  Fax:    / signature: {Esignature:[id number]}    PHYSICIAN SECTION    Prognosis: Guarded    Condition at Discharge: Medically stable but end stage dementia, Planning for hospice at facility    Rehab Potential (if transferring to Rehab): Guarded    Recommended Labs or Other Treatments After Discharge: -    Physician Certification: I certify the above information and transfer of Adilene Micheal  is necessary for the continuing treatment of the diagnosis listed and that she requires Jaya Brambila for greater 30 days. Update Admission H&P: Changes in H&P as follows - diet changes:      Recommended Diet: Recommend puree/thin liquids via small sips from cup or straw. Monitor closely for s/s of aspiration or respiratory decline- if any noted, dc PO and obtain SLP evaluation. Medication administration: crushed in puree     Strategies:   Feed only if fully alert  Upright with po and for 30-45 minutes after.    Small sips  Check for oral residue  Make sure swallow before next bite/presentation    PHYSICIAN

## 2023-05-26 NOTE — PROGRESS NOTES
4 Eyes Skin Assessment       NAME:  Leola Lundborg  YOB: 1922  MEDICAL RECORD NUMBER:  [id number]       The patient is being assessed for   Admission       I agree that One RN has performed a thorough Head to Toe Skin Assessment on the patient. ALL assessment sites listed below have been assessed. Areas assessed by both nurses:       Head, Face, Ears, Shoulders, Back, Chest, Arms, Elbows, Hands, Sacrum. Buttock, Coccyx, Ischium, and Legs. Feet and Heels                               Does the Patient have a Wound? No noted wound(s)      Blanchable redness noted to sacrum to gluteal cleft and bilateral heels. Scattered bruises and abrasions. Laceration to head from previous fall.    Hitesh Prevention initiated by RN: Yes   Wound Care Orders initiated by RN: NA       Pressure Injury (Stage 3,4, Unstageable, DTI, NWPT, and Complex wounds) if present, place referral order by RN under : NA       New and Established Ostomies, if present place, referral order under : NA        Nurse 1 eSignature: Electronically signed by Monroe Alexis RN on 5/23/23 at 1:37 AM EDT       **SHARE this note so that the co-signing nurse can place an eSignature**       Nurse 2 eSignature: Electronically signed by Doug Cardona RN on 5/22/23 at 9:37 PM EDT
Clinical Pharmacy Progress Note  Medication History     Admit Date: 5/22/2023    List of of current medications patient is taking is complete. Home Medication list in EPIC updated to reflect changes noted below. Source of information: medication list from [place]; list dated 5/22/23    Changes made to medication list:   Medications added: All medications below were added    Current Outpatient Medications   Medication Instructions    acetaminophen (TYLENOL) 1,000 mg, Oral, EVERY 6 HOURS PRN    amLODIPine (NORVASC) 5 mg, Oral, DAILY    busPIRone (BUSPAR) 5 mg, Oral, 2 TIMES DAILY    Calcium Carbonate-Vitamin D 600-5 MG-MCG CAPS 1 tablet, Oral, DAILY    cyanocobalamin 1,000 mcg, Oral, DAILY    ferrous sulfate (IRON 325) 325 mg, Oral, DAILY WITH BREAKFAST    guaiFENesin (ROBITUSSIN) 100 MG/5ML liquid 5 mLs, Oral, EVERY 4 HOURS PRN    hydrALAZINE (APRESOLINE) 25 mg, Oral, EVERY 8 HOURS    HYDROcodone-acetaminophen (NORCO) 5-325 MG per tablet 1 tablet, Oral, 2 TIMES DAILY    latanoprost (XALATAN) 0.005 % ophthalmic solution 1 drop, Ophthalmic, NIGHTLY    loperamide (IMODIUM) 2 mg, Oral, PRN, Max of 8 doses in 24hours    meloxicam (MOBIC) 7.5 mg, Oral, DAILY    ondansetron (ZOFRAN-ODT) 4 mg, Oral, EVERY 8 HOURS PRN    senna-docusate (PERICOLACE) 8.6-50 MG per tablet 1 tablet, Oral, DAILY PRN    sertraline (ZOLOFT) 75 mg, Oral, DAILY       Please call with any questions.   Uriel Agustin, Nancy, BCPS  Wireless: [id number]   5/23/2023 9:48 AM
Comprehensive Nutrition Assessment    RECOMMENDATIONS:  PO Diet: Continue Clear Liquid Diet; adv per MD  ONS: Begin Ensure Clear tid; Expedite jello cup qd. Nutrition Education: No recommendation at this time     NUTRITION ASSESSMENT:   Nutritional summary & status: + IP for wound. Pt with other staff in room on attempted visits x 2. Noted reports of poor appetite prior to admit. Minimal wt hx per EMR, however, no significant wt loss noted with available information. Increased nutrient needs indicated to promote healing of pressure injury st II on sacrum. Will add Ensure Clears tid for added nutrition as pt currently on Clear Liquid Diet. Will send Expedite jello cup qd to promote healing of wound. SLP recommending dysphagia- puree diet with thin liquids. Perfect served MD regarding possible diet advancement. Will continue to follow. Admission/PMH: GI bleed//Dementia, dysphagia, asp pneumonia    MALNUTRITION ASSESSMENT  Context of Malnutrition: Acute Illness   Malnutrition Status: Insufficient data  Findings of the 6 clinical characteristics of malnutrition (Minimum of 2 out of 6 clinical characteristics is required to make the diagnosis of moderate or severe Protein Calorie Malnutrition based on AND/ASPEN Guidelines):  Energy Intake:  Unable to assess  Weight Loss:  No significant weight loss     Body Fat Loss:  Unable to assess     Muscle Mass Loss:  Unable to assess      NUTRITION DIAGNOSIS   Increased nutrient needs related to increase demand for energy/nutrients as evidenced by wounds    Nutrition Monitoring and Evaluation:   Food/Nutrient Intake Outcomes:  Food and Nutrient Intake, Supplement Intake  Physical Signs/Symptoms Outcomes:  Biochemical Data     OBJECTIVE DATA: Significant to nutrition assessment  Nutrition Related Findings: No edema. LBM 5/23.  K+ 3.2 Mg 1.5  Wounds: Pressure Injury, Stage II, Stage I  Nutrition Goals: PO intake 75% or greater, by next RD assessment     CURRENT NUTRITION
Dr. Ranjana Cadena secure messaged to follow up on diet order, possible protonix drip per our discussion earlier today and plan of care.   Awaiting response
IV and telemetry removed from pt. Pt transported downstairs via stretcher via [facility] ambulance service.
Palliative Care Chart Review  and Check in Note:     NAME:  Matthew Clark  Admit Date: 5/22/2023  Hospital Day:  Hospital Day: 5   Current Code status: DNR-CCA    Palliative care is continuing to following Ms. David Bello for symptom management,  and goals of care discussion as needed. Patient's chart reviewed today 5/26/23. Saw pt at the bedside. She remains lethargic, PO intake minimal per RN at bedside. Spoke with CM RN Sylvia and Juanita, liaison with [facility]. Sylvia will make hospice referral today. Juanita to set up a time to meet with pt's family to discuss hospice services. The following are the currently established goals/code status, and Symptom management. Goals of care: Pt's daughter wants to continue with current treatment, however open to enrolling with hospice at discharge. She does worry about pt having been overmedicated by another hospice agency in the past, which we discussed in depth Wednesday.     Code status: [facility]    Discharge plan: likely return to LTC when medically ready for discharge with hospice      TYRONE Subramanian CNP  05/26/23  9:57 AM
Palliative Medicine Progress Note    Admit Date: 5/22/2023  Hospital Day:  Hospital Day: 3     CC: hematemesis  HPI: Tee Saldivar is a 80 y.o. female with PMH of advanced dementia, COPD, hypertension who presented with hematemesis on 5/22. Of note she had a recent admission on 5/13/2023 for an CHERELLE and UTI at [facility]. She is coming from [address]. She was admitted for possible GI bleed with anemia and possible right lower lobe pneumonia to the medicine service. Via phone call, I introduced palliative care to daughter, Ramiro Cowan who tells me that she is the primary decision maker. Patient also has a son, Zeeshan Emmanuel who lives in [place] and should be coming to visit today. Daughter reports that patient has been declining over the past 2 years. Patient is currently bed bound. She also has dementia and has usually been alert and oriented to only name, however, after recent hospital admission at [facility] she has been sleeping more, appears more fatigued. Daughter reports she had an unwitnessed fall at facility and since then has not been able to feed herself or be as responsive as she was. Daughter also reports that patient has been through multiple facilities. Tee Saldivar is currently being treated for Hypernatremia, failure to thrive, Possible RLL PNA and anemia with likely GIB. Pt is more alert today, working with SLP at bedside. She regards however is not communicating verbally. Spoke with pt's daughter Le Granados and granddaughter Morrell Lesches at the bedside regarding pt's medical condition in depth. Morrell Lesches reports pt has been declining recently, she worries about pt's comfort and quality of life. We discussed hospice. They report [facility] had [facility] past, however graduated from hospice services. They report that this was a good experience. Pt's daughter in particular worries about pt having been over medicated by another hospice agency several years ago.
Patient arrived to unit from ED. SBAR received. On 2L O2 via NC. Patients eyes closed with AMS. Skin assessment performed.  Electronically signed by Aram Abdullahi RN on 5/23/2023 at 1:40 AM
Speech Language Pathology  Facility/Department:[facility]    Dysphagia Evaluation and treat  Name: Kavya Baldwin  : 1922  MRN: [id number]                                                         Patient Diagnosis(es):   Patient Active Problem List    Diagnosis Date Noted    GI bleed 2023       No past medical history on file. No past surgical history on file. Reason for Referral:  Kavya Baldwin  was referred for a Speech Therapy evaluation to assess swallow function and/or communication. Imaging  XR CHEST PORTABLE   Final Result   Impression: Right basilar airspace opacity. Diffuse interstitial and pulmonary vascular prominence suggesting pulmonary edema. Date of onset: 23    Current Diet:  ADULT DIET; Clear Liquid      Treatment Diagnosis: Dysphagia    Pain:  denies    General:  Chart reviewed: Yes  Behavior/Cognition: Alert, Oriented to self only  Communication Observation: Occasional verbal response/request \"[employer]\"  Follows Directions: at times with cues  Dentition/Oral Mucosa: Edentulous, upper dentures; oral mucosa good  Oral motor exam: No labial weakness; no tongue protrusion; able to lateralize some to the right but not as much left  Vocal Quality: Hoarse  Respiratory Status/oxygen requirements: none  Vision/Hearing: [place]  Patient Complaint: none  Patient Positionin bed    History of present illness    Assessment and Plan:   Kavya Baldwin is a 80 y.o. female with a pmh of advanced dementia, COPD, hyperlipidemia, hypertension, recent hospitalization for GI bleed at [facility], B12 anemia who resides in a nursing home and was brought in hematemesis. patient has not had any episodes was on admission here and a digital rectal exam done by the ED physician was not consistent with dark stools.   Hemodynamically she has been stable patient is nonverbal and therefore history was from the chart as well as the ED nurse the family was not at the
Speech Language Pathology  Facility/Department:[facility]    Dysphagia treat    Name: Matthew Clark  : 1922  MRN: [id number]                                                     Patient Diagnosis(es):   Patient Active Problem List    Diagnosis Date Noted    Acute blood loss anemia 2023    Aspiration pneumonitis ([facility] [place] [facility] [address] 2023    Dysphagia 2023    Dementia ([facility] [place] [facility] [address] 2023    GI bleed 2023     No past medical history on file. No past surgical history on file. Imaging  XR CHEST PORTABLE   Final Result   Impression: Right basilar airspace opacity. Diffuse interstitial and pulmonary vascular prominence suggesting pulmonary edema. Date of onset: 23    Current Diet:  ADULT DIET; Clear Liquid  ADULT ORAL NUTRITION SUPPLEMENT; Breakfast, Lunch, Dinner; Clear Liquid Oral Supplement  ADULT ORAL NUTRITION SUPPLEMENT; Breakfast; Wound Healing Oral Supplement    Treatment Diagnosis: Dysphagia    Pain:  Denies    History of present illness  Per MD notes:  Assessment and Plan:   Matthew Clark is a 80 y.o. female with a pmh of advanced dementia, COPD, hyperlipidemia, hypertension, recent hospitalization for GI bleed at [facility], B12 anemia who resides in a nursing home and was brought in hematemesis. patient has not had any episodes was on admission here and a digital rectal exam done by the ED physician was not consistent with dark stools. Hemodynamically she has been stable patient is nonverbal and therefore history was from the chart as well as the ED nurse the family was not at the bedside at the time of evaluation. Patient is a DO NOT RESUSCITATE. Patient allegedly has had a progressive decline over the last several days including worsening mental status with decreased oral intake she had recently been hospitalized at [facility] for encephalopathy urinary tract infection and acute kidney injury and subsequently discharged.   She apparently had a
This RN Attempted to give the patient a sip of water while sitting up in bed, Pt began coughing after a sip of water was taken. MD made aware.     Electronically signed by Lakesha Alexander RN on 5/24/2023 at 2:18 AM
This RN Was notified by CMR that pt had 7 beats of Vtach on tele. MD made aware.     Electronically signed by Gene Haas RN on 5/25/2023 at 6:33 AM
improved    ____________________________________________________________________________    Subjective:   Overnight Events:   Uneventful overnight  Remains pleasantly confused      Physical Exam:  BP (!) 154/62   Pulse 69   Temp 98.4 °F (36.9 °C) (Axillary)   Resp 18   Ht 5' 1\" (1.549 m)   Wt 104 lb 15 oz (47.6 kg)   SpO2 93%   BMI 19.83 kg/m²   General appearance: Pleasant confused, noncombative  HEENT: Normocephalic, atraumatic, MMM, No sclera icterus/conjuctival palor  Neck: Supple, no thyromegally. No jugular venous distention. Respiratory:  Normal respiratory effort. Clear to auscultation, no Rales/Wheezes/Rhonchi. Cardiovascular: S1/S2 without murmurs, rubs or gallops. RRR  Abdomen: Soft, non-tender, non-distended, bowel sounds present. Musculoskeletal: No clubbing, cyanosis or edema bilaterally. Skin: Skin color, texture, turgor normal.  No rashes or lesions. Neurologic:  Cranial nerves: II-XII intact, MARTINA, No focal sensory/motor deficits        Intake/Output Summary (Last 24 hours) at 5/25/2023 1412  Last data filed at 5/25/2023 0549  Gross per 24 hour   Intake 290 ml   Output 360 ml   Net -70 ml       Labs:   Recent Labs     05/22/23  1414 05/24/23  1337   WBC 22.0*  --    HGB 7.3* 7.9*   HCT 22.4*  --      --       Recent Labs     05/22/23  1414 05/23/23  0427 05/25/23  0619   * 146* 142   K 3.0* 3.3* 3.2*   * 113* 110   CO2 19* 21 20*   BUN 45* 42* 29*   CREATININE 0.9 0.8 0.6   CALCIUM 7.9* 8.2* 8.1*   AST 54* 43*  --    ALT 23 23  --    BILIDIR  --  <0.2  --    BILITOT 0.3 <0.2  --    ALKPHOS 65 71  --      No results for input(s): CKTOTAL, TROPONINI in the last 72 hours.     Urinalysis:    Lab Results   Component Value Date/Time    NITRU Negative 05/22/2023 06:03 PM    WBCUA 3-5 05/22/2023 06:03 PM    BACTERIA 1+ 05/22/2023 06:03 PM    RBCUA 0-2 05/22/2023 06:03 PM    BLOODU Negative 05/22/2023 06:03 PM    SPECGRAV 1.025 05/22/2023 06:03 PM    GLUCOSEU Negative
lobe infiltrate and there is concern for aspiration. At this time patient will be kept n.p.o. patient also has leukocytosis and received cefoxitin in the ED. Patient's next of kin is Ms. Ash Hines who is also power of  and I did not speak to this person but the ED physician did and patient is to remain DNR and at this time they do not wish to pursue any surgical procedures. GI was not consulted. BNP was also elevated. Clinically the patient looks dry with reduced skin turgor and dry mucous membranes. She had received Lasix at the time I evaluated and she did not have any crepitations she was on oxygen via nasal cannula and looked comfortable. Prior Dysphagia History:  attempted to call nursing facility but no answer. Transfer notes indicate pt on pureed with nectar (was eating roast beef last Tuesday per granddaughter). Bedside Swallowing Evaluation Impression 5/24/23:   Family reported pt eating roast beef a week ago without difficulty. Nursing reported night shift staff reported problems with swallowing. Nursing home transfer notes indicate pt was on puree/nectar (had recent fall prior to this per POA - daughter). Ok to proceed with po foods beyond clears per nursing d/w Dr. Chandni Hernandez. Pt alert, oriented to self only. Mild anterior spillage on the left at times, holding of applesauce (family reported does not like it) but did clear with alternate with a drink. With pudding pt with mild holding (2-5 seconds) with no oral residue. Pt given oral care prior to po trials. No cough/wet vocal quality or throat clearing with any po trials except thins consecutive swallows via cup and with use of a straw. Mild intermittent swallow delay with thins with occasional burping noted. Pt has been on clears per nursing, family reported given thins yesterday with occasional cough (may be related to lethargy/amount given/use of straw) but nursing indicated lungs are improving.  D/W Dr. Chandni Hernandez who indicated to hold off
present    Admitted by hospitalist diagnosed with a pneumonia. Currently on Protonix IV  They state that she has had multiple falls and several admissions to outside hospitals prior to coming here          Review of Systems:      Pertinent positives and negatives discussed in HPI    Objective:     Vitals:     Blood pressure 90/90 pulse 60 temperature 97.3 O2 sat 94% on 2 L          Physical Exam:      General: NAD and frail female sitting in stretcher alert making eye contact. Eyes: EOMI  ENT: neck supple  Cardiovascular: Regular rate. Respiratory: Clear to auscultation. Scattered rhonchi on the right side  Gastrointestinal: Soft, non tender  Genitourinary: no suprapubic tenderness  Musculoskeletal: No edema  Skin: warm, dry patient has small abrasion above her left eye  Neuro: Alert. Psych: Mood appropriate. Medications:       Labs and Imaging   CT HEAD WO CONTRAST    Result Date: 5/18/2023  Site: [facility]ane[facility] #: [id number] #: [id number]: BNEDAccount #: [de-identified] #: [id number] #: [id number]: CT HEAD WO CONTRASTExam Date/Time: 05/18/2023 03:05 PMAdmitting Diagnosis: fall, head traumaReason for Exam: fall, head trauma Dictated by: Raul Luis ELYSE: 05/18/2023 03:51 PMT: This document is confidential medical information. Unauthorized disclosure or use of this information is prohibited by law. If you are not the intended recipient of this document, please advise us by calling immediately [phone]. Impression/Conclusion below HISTORY:   fall, head trauma fall, head trauma COMPARISON:  Head CT 8/20/2022 TECHNIQUE:  Noncontrast multiplanar CT images of the head NOTE:  If there are questions about the content of this report, please contact [address] [place] [address] radiology by calling [phone] FINDINGS: BRAIN PARENCHYMA: Moderate scattered areas of low attenuation in the white matter, likely representing chronic small vessel ischemic changes. Scattered small remote infarcts.   No
head traumaReason for Exam: fall, head trauma Dictated by: Levi De Jesus ELYSE: 05/18/2023 03:51 PMT: This document is confidential medical information. Unauthorized disclosure or use of this information is prohibited by law. If you are not the intended recipient of this document, please advise us by calling immediately [phone]. Impression/Conclusion below HISTORY:   fall, head trauma fall, head trauma COMPARISON:  Head CT 8/20/2022 TECHNIQUE:  Noncontrast multiplanar CT images of the head NOTE:  If there are questions about the content of this report, please contact [address] [place] [address] radiology by calling [phone] FINDINGS: BRAIN PARENCHYMA: Moderate scattered areas of low attenuation in the white matter, likely representing chronic small vessel ischemic changes. Scattered small remote infarcts. No acute  loss of gray-white matter differentiation. No mass effect. No acute intracranial hemorrhage. VENTRICLES/SULCI: Moderate diffuse proportionate dilation of ventricles and sulci, consistent with atrophy. No hydrocephalus EXTRA-AXIAL SPACES:  Unremarkable PARANASAL SINUSES/MASTOIDS: Unremarkable BONES:  Unremarkable OTHER: Atherosclerotic calcifications of the intracranial arteries. Left frontal scalp hematoma. IMPRESSION: No acute intracranial abnormality. SIGNED BY: Lucy Matos MD on 5/18/2023  3:48 PM   [address] [facility] [phone] [facility] Call Center: [phone]       CT CERVICAL SPINE WO CONTRAST    Result Date: 5/18/2023  Site: Zohra Lovelace #: [id number] #: [id number]: BNEDAccount #: [de-identified] #: [id number] #: [id number]: CT CERVICAL SPINE WO CONTRASTExam Date/Time: 05/18/2023 03:05 PMAdmitting Diagnosis: fall, head trauma, dementia and  unable to clearReason for Exam: fall, head trauma, dementia and unable to clear Dictated by: Usha Panchal ELYSE: 05/18/2023 04:43 PMT: This document is confidential medical information.   Unauthorized

## 2023-05-26 NOTE — CARE COORDINATION
SW sent hospice referrals to [facility] and [facility]. Sw spoke to [facility] and informed them to call pt's daughter      SW set up transport via [facility] for 5:30 for potential DC today. RN to call report: [phone]      Full DC note to follow if pt DCs today.     Electronically signed by VITA Georges, MARLON on 5/26/2023 at 11:12 AM  [phone]

## 2023-05-26 NOTE — DISCHARGE SUMMARY
Discharge Summary    Name:  Lexy Page /Age/Sex: 1922  (80 y.o. female)   MRN & CSN:  [id number] & [id number] Admission Date/Time: 2023  1:37 PM   Attending:  Maida Barrett MD Discharging Physician: Maida Barrett MD     Discharge diagnosis and plan:    Hematemesis, suspected upper GI bleed. Acute blood loss anemia. Patient was started on Protonix drip. GI team was consulted. Hemoglobin remained stable as well as hemodynamics. Patient transition to Protonix twice daily. Given the age and risk involved, no EGD was done. Conservative management was continued. Hemoglobin remained stable as well as hemodynamics. Her diet has been advanced. Plan for hospice consult outpatient. Comfort feeding may be continued despite concern for dysphagia and aspiration if patient goes hospice. Aspiration pneumonia  Patient had concerns of aspiration aspiration pneumonitis and was continued on cefepime. She has remained stable without any fevers or significant leukocytosis. She will be de-escalated to oral antibiotics with amoxicillin clavulanic acid at discharge. Dysphagia  Patient seen by SLP while here in the hospital.  Initially started on liquid diet, now advanced to pureed with thin liquids. Tolerating fine. She may be advanced to regular diet as tolerated if patient goes hospice despite the concern for dysphagia and aspiration. HTN  Blood pressure was initially low normal at admission and her home medication were held but they have since been resumed. She will continue to take amlodipine 5 Mg daily and hydralazine 25 Mg twice daily. Severe dementia  Patient will return to [facility] (SNF). She is on a waiting list [facility] bed at [facility]. Plan for outpatient hospice.         Discharge Exam  BP (!) 150/55   Pulse 61   Temp 97.7 °F (36.5 °C) (Axillary)   Resp 16   Ht 5' 1\" (1.549 m)   Wt 104 lb 15 oz (47.6 kg)   SpO2 98%   BMI 19.83 kg/m²     Physical Exam  Constitutional:

## 2023-05-26 NOTE — CARE COORDINATION
Case Management Assessment            Discharge Note                    Date / Time of Note: 5/26/2023 3:11 PM                  Discharge Note Completed by: VITA Leonard, MARLENAW    Patient Name: Shelbi Small   YOB: 1922  Diagnosis: Acute blood loss anemia [D62]  GI bleed [K92.2]  Upper GI bleed [K92.2]  Coffee ground emesis [K92.0]  Pulmonary edema with congestive heart failure ([facility] [address] [I50.1]  History of ESBL E. coli infection [Z86.19]  Pneumonia of right lower lobe due to infectious organism [J18.9]   Date / Time: 5/22/2023  1:37 PM    Current PCP: No primary care provider on file. Clinic patient: No    Hospitalization in the last 30 days: No       Advance Directives:  Code Status: DNR-CCA  [facility] DNR form completed and on chart: Yes    Financial:  Payor: Israel Anderson / Plan: Heide Lemons / Product Type: *No Product type* /      Pharmacy:    [address], [address] [place] [address]  [id number] [place] [address]  Phone: [phone] Fax: [phone]      Assistance purchasing medications?: Potential Assistance Purchasing Medications: No  Assistance provided by Case Management: None at this time    Does patient want to participate in local refill/ meds to beds program?:      Meds To Beds General Rules:  1. Can ONLY be done Monday- Friday between 8:30am-5pm  2. Prescription(s) must be in pharmacy by 3pm to be filled same day  3. Copy of patient's insurance/ prescription drug card and patient face sheet must be sent along with the prescription(s)  4. Cost of Rx cannot be added to hospital bill. If financial assistance is needed, please contact unit  or ;  or  CANNOT provide pharmacy voucher for patients co-pays  5. Patients can then  the prescription on their way out of the hospital at discharge, or pharmacy can deliver to the bedside if staff is available.

## 2023-05-26 NOTE — PLAN OF CARE
Problem: Discharge Planning  Goal: Discharge to home or other facility with appropriate resources  5/26/2023 0237 by Linwood Sanchez RN  Outcome: Progressing  5/26/2023 0235 by Linwood Sanchez RN  Outcome: Progressing     Problem: Safety - Adult  Goal: Free from fall injury  5/26/2023 0237 by Linwood Sanchez RN  Outcome: Progressing  5/26/2023 0235 by Linwood Sanchez RN  Outcome: Progressing  5/25/2023 1727 by Mago Dennis RN  Outcome: Progressing  Flowsheets (Taken 5/25/2023 1727)  Free From Fall Injury:   [place] Nai family/caregiver on patient safety   Based on caregiver fall risk screen, instruct family/caregiver to ask for assistance with transferring infant if caregiver noted to have fall risk factors     Problem: Confusion  Goal: Confusion, delirium, dementia, or psychosis is improved or at baseline  Description: INTERVENTIONS:  1. Assess for possible contributors to thought disturbance, including medications, impaired vision or hearing, underlying metabolic abnormalities, dehydration, psychiatric diagnoses, and notify attending LIP  2. [place] high risk fall precautions, as indicated  3. Provide frequent short contacts to provide reality reorientation, refocusing and direction  4. Decrease environmental stimuli, including noise as appropriate  5. Monitor and intervene to maintain adequate nutrition, hydration, elimination, sleep and activity  6. If unable to ensure safety without constant attention obtain sitter and review sitter guidelines with assigned personnel  7.  Initiate Psychosocial CNS and Spiritual Care consult, as indicated  5/26/2023 0237 by Linwood Sanchez RN  Outcome: Progressing  5/25/2023 1727 by Mago Dennis RN  Flowsheets (Taken 5/25/2023 1727)  Effect of thought disturbance (confusion, delirium, dementia, or psychosis) are managed with adequate functional status:   Assess for contributors to thought disturbance, including medications, impaired vision or hearing, underlying metabolic

## 2023-06-07 ENCOUNTER — APPOINTMENT (OUTPATIENT)
Dept: GENERAL RADIOLOGY | Age: 88
End: 2023-06-07
Payer: COMMERCIAL

## 2023-06-07 ENCOUNTER — HOSPITAL ENCOUNTER (EMERGENCY)
Age: 88
Discharge: HOME | End: 2023-06-07
Attending: EMERGENCY MEDICINE
Payer: COMMERCIAL

## 2023-06-07 ENCOUNTER — TELEPHONE (OUTPATIENT)
Dept: OTHER | Facility: CLINIC | Age: 88
End: 2023-06-07

## 2023-06-07 VITALS
RESPIRATION RATE: 12 BRPM | TEMPERATURE: 99.2 F | SYSTOLIC BLOOD PRESSURE: 184 MMHG | OXYGEN SATURATION: 90 % | DIASTOLIC BLOOD PRESSURE: 75 MMHG | HEART RATE: 65 BPM

## 2023-06-07 DIAGNOSIS — Z51.5 END OF LIFE CARE: ICD-10-CM

## 2023-06-07 DIAGNOSIS — E86.0 DEHYDRATION: Primary | ICD-10-CM

## 2023-06-07 LAB
ANION GAP SERPL CALCULATED.3IONS-SCNC: 12 MMOL/L (ref 3–16)
BASOPHILS # BLD: 0.1 K/UL (ref 0–0.2)
BASOPHILS NFR BLD: 0.8 %
BUN SERPL-MCNC: 22 MG/DL (ref 7–20)
CALCIUM SERPL-MCNC: 8.8 MG/DL (ref 8.3–10.6)
CHLORIDE SERPL-SCNC: 112 MMOL/L (ref 99–110)
CO2 SERPL-SCNC: 28 MMOL/L (ref 21–32)
CREAT SERPL-MCNC: 0.7 MG/DL (ref 0.6–1.2)
DEPRECATED RDW RBC AUTO: 15.3 % (ref 12.4–15.4)
EOSINOPHIL # BLD: 0.1 K/UL (ref 0–0.6)
EOSINOPHIL NFR BLD: 0.6 %
GFR SERPLBLD CREATININE-BSD FMLA CKD-EPI: >60 ML/MIN/{1.73_M2}
GLUCOSE SERPL-MCNC: 102 MG/DL (ref 70–99)
HCT VFR BLD AUTO: 28.7 % (ref 36–48)
HGB BLD-MCNC: 9.3 G/DL (ref 12–16)
LYMPHOCYTES # BLD: 1.8 K/UL (ref 1–5.1)
LYMPHOCYTES NFR BLD: 19 %
MAGNESIUM SERPL-MCNC: 1.5 MG/DL (ref 1.8–2.4)
MCH RBC QN AUTO: 30.3 PG (ref 26–34)
MCHC RBC AUTO-ENTMCNC: 32.4 G/DL (ref 31–36)
MCV RBC AUTO: 93.6 FL (ref 80–100)
MONOCYTES # BLD: 0.5 K/UL (ref 0–1.3)
MONOCYTES NFR BLD: 5.1 %
NEUTROPHILS # BLD: 7 K/UL (ref 1.7–7.7)
NEUTROPHILS NFR BLD: 74.5 %
PLATELET # BLD AUTO: 267 K/UL (ref 135–450)
PMV BLD AUTO: 8.3 FL (ref 5–10.5)
POTASSIUM SERPL-SCNC: 3 MMOL/L (ref 3.5–5.1)
RBC # BLD AUTO: 3.07 M/UL (ref 4–5.2)
SODIUM SERPL-SCNC: 152 MMOL/L (ref 136–145)
WBC # BLD AUTO: 9.4 K/UL (ref 4–11)

## 2023-06-07 PROCEDURE — 2580000003 HC RX 258: Performed by: EMERGENCY MEDICINE

## 2023-06-07 PROCEDURE — 85025 COMPLETE CBC W/AUTO DIFF WBC: CPT

## 2023-06-07 PROCEDURE — 80048 BASIC METABOLIC PNL TOTAL CA: CPT

## 2023-06-07 PROCEDURE — 83735 ASSAY OF MAGNESIUM: CPT

## 2023-06-07 PROCEDURE — 71045 X-RAY EXAM CHEST 1 VIEW: CPT

## 2023-06-07 RX ORDER — SODIUM CHLORIDE, SODIUM LACTATE, POTASSIUM CHLORIDE, AND CALCIUM CHLORIDE .6; .31; .03; .02 G/100ML; G/100ML; G/100ML; G/100ML
500 INJECTION, SOLUTION INTRAVENOUS ONCE
Status: COMPLETED | OUTPATIENT
Start: 2023-06-07 | End: 2023-06-07

## 2023-06-07 RX ADMIN — SODIUM CHLORIDE, POTASSIUM CHLORIDE, SODIUM LACTATE AND CALCIUM CHLORIDE 500 ML: 600; 310; 30; 20 INJECTION, SOLUTION INTRAVENOUS at 15:42

## 2023-06-07 NOTE — DISCHARGE INSTRUCTIONS
Please follow-up with your hospice team about your care and about any medication questions. While Ms. Day Melara is at risk for aspiration, she can have food and drink for comfort.

## 2023-06-07 NOTE — TELEPHONE ENCOUNTER
Writer contacted ED provider to inform of 30 day readmission risk. No Decision on disposition at this time.       Call Back: If you need to call back to inform of disposition you can contact me at [phone]    Attending physician:  Dr. Keke Mota

## 2023-06-07 NOTE — ED PROVIDER NOTES
scene for an actively passing patient. Family stated they wanted her brought to the ER for antibiotics and fluids. Pt has a low SPO2 of 80% on room air. Pt on 2 LPM typically and beck to 90%. Pts only response is opening her eyes with stimuli. Pt has a current hip fx. )      History of Present Illness     Yamile Vaughn is a 80 y.o. female with past medical history inclusive of dementia, dysphagia, aspiration pneumonitis and recent diagnosis of hip fracture who presents for evaluation of concern for pneumonia. Notably, patient is under hospice care and is getting hospice care in a facility. Patient is somnolent and does not provide any history. Patient's daughter (POA) and granddaughter are present at the bedside and they report concern that she is declining mentally such that a few days ago she was awake and eating and is now not eating. They are concerned that staff told them that she is an aspiration risk and should not be allowed to eat and drink and they are worried that she is getting dehydrated and that she may have pneumonia because of decreased oxygen levels and gurgling they hear in her throat. Review of Systems     Pertinent positive and negative findings as documented in the HPI. Otherwise a complete ROS was performed and all other systems were reviewed and were negative. Physical Exam     INITIAL VITALS: BP: (!) 162/83, Temp: 99.2 °F (37.3 °C), Pulse: 64, Respirations: 20, SpO2: 91 %     Nursing note and vitals reviewed. Physical Exam  Constitutional:       Appearance: She is not toxic-appearing. Comments: Sleeping comfortably, appears stated age. No distress. HENT:      Head: Normocephalic. Mouth/Throat:      Comments: Slightly dry oral mucosa though patient sleeping with mouth open  Eyes:      General:         Right eye: No discharge. Left eye: No discharge. Cardiovascular:      Rate and Rhythm: Normal rate.    Pulmonary:      Effort: Pulmonary effort is normal. No